# Patient Record
Sex: FEMALE | Race: WHITE
[De-identification: names, ages, dates, MRNs, and addresses within clinical notes are randomized per-mention and may not be internally consistent; named-entity substitution may affect disease eponyms.]

---

## 2017-09-01 ENCOUNTER — HOSPITAL ENCOUNTER (EMERGENCY)
Dept: HOSPITAL 75 - ER | Age: 37
Discharge: HOME | End: 2017-09-01
Payer: COMMERCIAL

## 2017-09-01 VITALS — HEIGHT: 63 IN | BODY MASS INDEX: 35.44 KG/M2 | WEIGHT: 200 LBS

## 2017-09-01 VITALS — SYSTOLIC BLOOD PRESSURE: 128 MMHG | DIASTOLIC BLOOD PRESSURE: 67 MMHG

## 2017-09-01 DIAGNOSIS — E27.8: ICD-10-CM

## 2017-09-01 DIAGNOSIS — Z87.442: ICD-10-CM

## 2017-09-01 DIAGNOSIS — R10.31: Primary | ICD-10-CM

## 2017-09-01 DIAGNOSIS — Z96.0: ICD-10-CM

## 2017-09-01 DIAGNOSIS — Z98.51: ICD-10-CM

## 2017-09-01 DIAGNOSIS — F17.210: ICD-10-CM

## 2017-09-01 LAB
ALBUMIN SERPL-MCNC: 3.9 GM/DL (ref 3.2–4.5)
ALT SERPL-CCNC: 18 U/L (ref 0–55)
ANION GAP SERPL CALC-SCNC: 13 MMOL/L (ref 5–14)
AST SERPL-CCNC: 16 U/L (ref 5–34)
BASOPHILS # BLD AUTO: 0 10^3/UL (ref 0–0.1)
BASOPHILS NFR BLD AUTO: 0 % (ref 0–10)
BILIRUB SERPL-MCNC: 0.3 MG/DL (ref 0.1–1)
BILIRUB UR QL STRIP: NEGATIVE
BUN SERPL-MCNC: 7 MG/DL (ref 7–18)
BUN/CREAT SERPL: 10
CALCIUM SERPL-MCNC: 9.4 MG/DL (ref 8.5–10.1)
CHLORIDE SERPL-SCNC: 106 MMOL/L (ref 98–107)
CO2 SERPL-SCNC: 19 MMOL/L (ref 21–32)
CREAT SERPL-MCNC: 0.69 MG/DL (ref 0.6–1.3)
EOSINOPHIL # BLD AUTO: 0.1 10^3/UL (ref 0–0.3)
EOSINOPHIL NFR BLD AUTO: 1 % (ref 0–10)
ERYTHROCYTE [DISTWIDTH] IN BLOOD BY AUTOMATED COUNT: 14.2 % (ref 10–14.5)
GFR SERPLBLD BASED ON 1.73 SQ M-ARVRAT: > 60 ML/MIN
GLUCOSE SERPL-MCNC: 107 MG/DL (ref 70–105)
KETONES UR QL STRIP: NEGATIVE
LEUKOCYTE ESTERASE UR QL STRIP: NEGATIVE
LYMPHOCYTES # BLD AUTO: 2.6 X 10^3 (ref 1–4)
LYMPHOCYTES NFR BLD AUTO: 34 % (ref 12–44)
MCH RBC QN AUTO: 31 PG (ref 25–34)
MCHC RBC AUTO-ENTMCNC: 34 G/DL (ref 32–36)
MCV RBC AUTO: 90 FL (ref 80–99)
MONOCYTES # BLD AUTO: 0.5 X 10^3 (ref 0–1)
MONOCYTES NFR BLD AUTO: 7 % (ref 0–12)
NEUTROPHILS # BLD AUTO: 4.4 X 10^3 (ref 1.8–7.8)
NEUTROPHILS NFR BLD AUTO: 59 % (ref 42–75)
NITRITE UR QL STRIP: NEGATIVE
PH UR STRIP: 6.5 [PH] (ref 5–9)
PLATELET # BLD: 268 10^3/UL (ref 130–400)
PMV BLD AUTO: 10.5 FL (ref 7.4–10.4)
POTASSIUM SERPL-SCNC: 3.2 MMOL/L (ref 3.6–5)
PROT SERPL-MCNC: 6.9 GM/DL (ref 6.4–8.2)
PROT UR QL STRIP: NEGATIVE
RBC # BLD AUTO: 4.07 10^6/UL (ref 4.35–5.85)
SODIUM SERPL-SCNC: 138 MMOL/L (ref 135–145)
SP GR UR STRIP: 1.01 (ref 1.02–1.02)
UROBILINOGEN UR-MCNC: NORMAL MG/DL
WBC # BLD AUTO: 7.5 10^3/UL (ref 4.3–11)
WBC #/AREA URNS HPF: (no result) /HPF

## 2017-09-01 PROCEDURE — 85025 COMPLETE CBC W/AUTO DIFF WBC: CPT

## 2017-09-01 PROCEDURE — 80306 DRUG TEST PRSMV INSTRMNT: CPT

## 2017-09-01 PROCEDURE — 96361 HYDRATE IV INFUSION ADD-ON: CPT

## 2017-09-01 PROCEDURE — 80053 COMPREHEN METABOLIC PANEL: CPT

## 2017-09-01 PROCEDURE — 74176 CT ABD & PELVIS W/O CONTRAST: CPT

## 2017-09-01 PROCEDURE — 81000 URINALYSIS NONAUTO W/SCOPE: CPT

## 2017-09-01 PROCEDURE — 36415 COLL VENOUS BLD VENIPUNCTURE: CPT

## 2017-09-01 PROCEDURE — 96374 THER/PROPH/DIAG INJ IV PUSH: CPT

## 2017-09-01 PROCEDURE — 84703 CHORIONIC GONADOTROPIN ASSAY: CPT

## 2017-09-01 PROCEDURE — 74000: CPT

## 2017-09-01 NOTE — DIAGNOSTIC IMAGING REPORT
INDICATION: Right-sided abdominal pain.



COMPARISON: CT abdomen and pelvis performed earlier same day.



FINDINGS:

Nonobstructive bowel gas pattern. No free intraperitoneal air.

Cholecystectomy. No mineralized renal or ureteral calculi by

radiography. Lung bases are clear. Normal regional skeleton.



IMPRESSION:

1. No acute abnormality of the abdomen by radiography.



Dictated by: 



  Dictated on workstation # [id number]

## 2017-09-01 NOTE — ED ABDOMINAL PAIN
General


Chief Complaint:  Abdominal/GI Problems


Stated Complaint:  ABD PAIN,LOWER RT SIDE


Nursing Triage Note:  


PT TO ED 7 W/ C/O RLQ PAIN ONSET X4 DAYS, WORSE TODAY.  DENIES N/V/D.  DENIES 


ANY URINARY C/O BUT DOES REPORT FEELS SIMILAR TO WHEN SHE HAD A KIDNEY STONE 


LAST


Sepsis Screen:  No Definite Risk


Source of Information:  Patient





History of Present Illness


Time Seen By Provider:  00:24


Initial Comments


C/O RLQ PAIN X 4 DAYS, AND GRADUALLY GETTING WORSE


PAIN IS CONSTANT AND IS MUCH WORSE TODAY


PAIN IS WORSE WITH MOVEMENTS, BENDING OVER


STATES SHE HAS HAD A KIDNEY STONE IN THE PAST AND THIS FEELS SIMILAR


NO URINARY SYMPTOMS


NO FEVER


NO NAUSEA/VOMITING


NO CHANGE IN CHRONIC LOWER BACK PAIN 


HAS NOT TAKEN ANYTHING FOR PAIN 


LMP 1 WEEK AGO, NORMAL. PT HAS HAD BTL





PCP--JUST MOVED HERE 1 MONTH AGO FROM [place], BUT PLANS TO  ESTABLISH WITH [facility]





Allergies and Home Medications


Allergies


Coded Allergies:  


     No Known Drug Allergies (Unverified , 9/1/17)





Home Medications


Cyclobenzaprine HCl 10 Mg Tablet, 10 MG PO Q8H, #15


   Prescribed by: BECKY KESSLER on 9/1/17 0148


Naproxen 500 Mg Tablet, 500 MG PO BID, #20


   Prescribed by: BECKY KESSLER on 9/1/17 0148





Review of Systems


Constitutional:  no symptoms reported


Respiratory:  No Symptoms Reported


Cardiovascular:  No Symptoms Reported


Gastrointestinal:  See HPI, Abdominal Pain, Denies Constipated, Denies Diarrhea

, Denies Nausea, Denies Vomiting


Genitourinary:  No Symptoms Reported


Musculoskeletal:  see HPI


Skin:  no symptoms reported


Psychiatric/Neurological:  No Symptoms Reported


Endocrine:  No Symptoms Reported


Hematologic/Lymphatic:  No Symptoms Reported





Past Medical-Social-Family Hx


Patient Social History


Alcohol Use:  Occasionally Uses


Recreational Drug Use:  No


Smoking Status:  Current Everyday Smoker (1 PPD)


Type Used:  Cigarettes


Recent Foreign Travel:  No


Contact w/Someone Who Travel:  No


Recent Infectious Disease Expo:  No


Recent Hopitalizations:  No


Physical Abuse:  No


Sexual Abuse:  No


Mistreated:  No


Fear:  No





Surgeries


History of Surgeries:  Yes (KIDNEY STONE/URETERAL STENT PLACEMENT, D&C)


Surgeries:  Gallbladder, Renal, Tubal Ligation





Respiratory


History of Respiratory Disorde:  No





Cardiovascular


History of Cardiac Disorders:  No





Neurological


History of Neurological Disord:  No





Reproductive System


Pregnant:  No


Hx Reproductive Disorders:  No


GYN History:  Tubal Ligation





Genitourinary


History of Genitourinary Disor:  Yes


Genitourinary Disorders:  Kidney Stones





Gastrointestinal


History of Gastrointestinal Di:  Yes


Gastrointestinal Disorders:  Gastroesophageal Reflux





Musculoskeletal


History of Musculoskeletal Dis:  Yes


Musculoskeletal Disorders:  Chronic Back Pain





Endocrine


History of Endocrine Disorders:  No





HEENT


History of HEENT Disorders:  No





Cancer


History of Cancer:  No





Psychosocial


History of Psychiatric Problem:  No


Suicide Risk Score:  0





Integumentary


History of Skin or Integumenta:  No





Blood Transfusions


History of Blood Disorders:  No





Physical Exam


Vital Signs


Capillary Refill : Less Than 3 Seconds


General Appearance:  WD/WN, no apparent distress


HEENT:  PERRL/EOMI, other (EXTENSIVE DENTAL DECAY)


Neck:  normal inspection


Respiratory:  normal breath sounds, no respiratory distress, no accessory 

muscle use


Cardiovascular:  regular rate, rhythm, no murmur


Gastrointestinal:  normal bowel sounds, soft, no organomegaly, no pulsatile mass

, No distended, No guarding, No rebound, tenderness (RLQ), No hernia, No mass


Extremities:  normal inspection


Back:  normal inspection, no CVA tenderness


Neurologic/Psychiatric:  CNs II-XII nml as tested, no motor/sensory deficits, 

alert, normal mood/affect, oriented x 3


Skin:  normal color, warm/dry, other (MULTIPLE SORES/SCABS/SCARS TO FACE)





Progress/Results/Core Measures


Results/Orders


Lab Results





Laboratory Tests








Test


  9/1/17


00:22 9/1/17


00:36 Range/Units


 


 


Urine Color YELLOW    


 


Urine Clarity


  SLIGHTLY


CLOUDY 


   


 


 


Urine pH 6.5   5-9  


 


Urine Specific Gravity 1.015 L  1.016-1.022  


 


Urine Protein NEGATIVE   NEGATIVE  


 


Urine Glucose (UA) NEGATIVE   NEGATIVE  


 


Urine Ketones NEGATIVE   NEGATIVE  


 


Urine Nitrite NEGATIVE   NEGATIVE  


 


Urine Bilirubin NEGATIVE   NEGATIVE  


 


Urine Urobilinogen NORMAL   NORMAL  MG/DL


 


Urine Leukocyte Esterase NEGATIVE   NEGATIVE  


 


Urine RBC (Auto) NEGATIVE   NEGATIVE  


 


Urine RBC NONE    /HPF


 


Urine WBC NONE    /HPF


 


Urine Squamous Epithelial


Cells 10-25 H


  


   /HPF


 


 


Urine Crystals NONE    /LPF


 


Urine Bacteria TRACE    /HPF


 


Urine Casts NONE    /LPF


 


Urine Mucus SMALL H   /LPF


 


Urine Culture Indicated NO    


 


Urine Pregnancy Test NEGATIVE   NEGATIVE  


 


Urine Opiates Screen POSITIVE H  NEGATIVE  


 


Urine Oxycodone Screen NEGATIVE   NEGATIVE  


 


Urine Methadone Screen NEGATIVE   NEGATIVE  


 


Urine Propoxyphene Screen NEGATIVE   NEGATIVE  


 


Urine Barbiturates Screen NEGATIVE   NEGATIVE  


 


Ur Tricyclic Antidepressants


Screen NEGATIVE 


  


  NEGATIVE  


 


 


Urine Phencyclidine Screen NEGATIVE   NEGATIVE  


 


Urine Amphetamines Screen NEGATIVE   NEGATIVE  


 


Urine Methamphetamines Screen NEGATIVE   NEGATIVE  


 


Urine Benzodiazepines Screen NEGATIVE   NEGATIVE  


 


Urine Cocaine Screen NEGATIVE   NEGATIVE  


 


Urine Cannabinoids Screen NEGATIVE   NEGATIVE  


 


White Blood Count


  


  7.5 


  4.3-11.0


10^3/uL


 


Red Blood Count


  


  4.07 L


  4.35-5.85


10^6/uL


 


Hemoglobin  12.6  11.5-16.0  G/DL


 


Hematocrit  37  35-52  %


 


Mean Corpuscular Volume  90  80-99  FL


 


Mean Corpuscular Hemoglobin  31  25-34  PG


 


Mean Corpuscular Hemoglobin


Concent 


  34 


  32-36  G/DL


 


 


Red Cell Distribution Width  14.2  10.0-14.5  %


 


Platelet Count


  


  268 


  130-400


10^3/uL


 


Mean Platelet Volume  10.5 H 7.4-10.4  FL


 


Neutrophils (%) (Auto)  59  42-75  %


 


Lymphocytes (%) (Auto)  34  12-44  %


 


Monocytes (%) (Auto)  7  0-12  %


 


Eosinophils (%) (Auto)  1  0-10  %


 


Basophils (%) (Auto)  0  0-10  %


 


Neutrophils # (Auto)  4.4  1.8-7.8  X 10^3


 


Lymphocytes # (Auto)  2.6  1.0-4.0  X 10^3


 


Monocytes # (Auto)  0.5  0.0-1.0  X 10^3


 


Eosinophils # (Auto)


  


  0.1 


  0.0-0.3


10^3/uL


 


Basophils # (Auto)


  


  0.0 


  0.0-0.1


10^3/uL


 


Sodium Level  138  135-145  MMOL/L


 


Potassium Level  3.2 L 3.6-5.0  MMOL/L


 


Chloride Level  106    MMOL/L


 


Carbon Dioxide Level  19 L 21-32  MMOL/L


 


Anion Gap  13  5-14  MMOL/L


 


Blood Urea Nitrogen  7  7-18  MG/DL


 


Creatinine


  


  0.69 


  0.60-1.30


MG/DL


 


Estimat Glomerular Filtration


Rate 


  > 60 


   


 


 


BUN/Creatinine Ratio  10   


 


Glucose Level  107 H   MG/DL


 


Calcium Level  9.4  8.5-10.1  MG/DL


 


Total Bilirubin  0.3  0.1-1.0  MG/DL


 


Aspartate Amino Transf


(AST/SGOT) 


  16 


  5-34  U/L


 


 


Alanine Aminotransferase


(ALT/SGPT) 


  18 


  0-55  U/L


 


 


Alkaline Phosphatase  101    U/L


 


Total Protein  6.9  6.4-8.2  GM/DL


 


Albumin  3.9  3.2-4.5  GM/DL








My Orders





Orders - BECKY KESSLER DO


Urine Pregnancy Bedside (9/1/17 00:23)


Ua Culture If Indicated (9/1/17 00:23)


Cbc With Automated Diff (9/1/17 00:32)


Comprehensive Metabolic Panel (9/1/17 00:32)


Drug Screen Stat (Urine) (9/1/17 00:32)


Ct Abd/Pelvis Wo(Kidney Stone) (9/1/17 00:32)


Abdomen/Kub 1view (9/1/17 00:32)


Saline Lock/Iv-Start (9/1/17 00:32)


Ketorolac Injection (Toradol Injection) (9/1/17 00:32)


Saline Lock/Iv-Start (9/1/17 00:32)


Lactated Ringers (Lr 1000 Ml Iv Solution (9/1/17 00:32)


Hcg,Qualitative Urine (9/1/17 00:57)


Rx-Cyclobenzaprine Tablet (Rx-Flexeril T (9/1/17 01:45)


Iv Push Med Admin Ed (9/1/17 )





Medications Given in ED





Vital Signs/I&O











Blood Pressure Mean:  94








Progress Note :  


Progress Note


PAIN EASED AT DISMISSAL





Diagnostic Imaging





Comments


ACUTE ABDOMEN XRAYS--NO ACUTE PROCESS, PENDING RADIOLOGIST REVIEW





CT ABDOMEN/PELVIS--NO ACUTE PROCESS, NO RENAL/URETERAL STONES, LEFT ADRENAL 

NODULE, SMALL HIATAL HERNIA AND FAT-CONTAINING UMBILICAL HERNIA--PER [facility]RAD 

VIA FAX @ [phone]


   Reviewed:  Reviewed by Me





Departure


Impression


Impression:  


 Primary Impression:  


 RLQ abdominal pain


 Additional Impression:  


 LEFT ADRENAL NODULE


Disposition:  01 HOME, SELF-CARE


Condition:  Stable





Departure-Patient Inst.


Referrals:  


NO,LOCAL PHYSICIAN (PCP)


Primary Care Physician








[facility]


Patient Instructions:  Acute Abdomen (Belly Pain), Adult (DC)





Add. Discharge Instructions:  


ACTIVITIES AS TOLERATED





FOLLOW UP WITH  IN 3-4 DAYS IF NO BETTER--LIST PROVIDED


RETURN TO ER IF WORSE





FOLLOW UP WITH  FOR FURTHER EVALUATION OF ADRENAL NODULE





All discharge instructions reviewed with patient and/or family. Voiced 

understanding.


Scripts


Cyclobenzaprine HCl (Cyclobenzaprine HCl) 10 Mg Tablet


10 MG PO Q8H, #15 TAB


   Prov: BECKY KESSLER DO         9/1/17 


Naproxen (Naproxen) 500 Mg Tablet


500 MG PO BID, #20 TAB


   Prov: BECKY KESSLER DO         9/1/17


Work/School Note:  Local Medical Staff Listing











BECKY KESSLER DO Sep 1, 2017 01:49

## 2017-09-01 NOTE — DIAGNOSTIC IMAGING REPORT
PROCEDURE: CT urinary tract, rule out kidney stone.



TECHNIQUE: Multiple contiguous axial images were obtained through

the abdomen and pelvis without the use of intravenous contrast.



INDICATION: Right-sided abdominal pain.



COMPARISON STUDIES: None.



FINDINGS: There is a minimal hiatal hernia. Lung bases are clear.

The gallbladder is absent. No duct dilatation or calculi are

seen. The liver, spleen, pancreas, and kidneys appear normal.

There is a left adrenal nodule measuring zero Hounsfield units.

This is 1.8 cm in diameter. Findings are consistent with an

adenoma. The appendix and bowel loops appear normal. A minimal

umbilical hernia is present containing fat only. No inflammation

is present. The urinary bladder, uterus and adnexal structures

appear normal.



IMPRESSION:

1. There is a minimal hiatal hernia and umbilical hernia

containing fat only.

2. Left adrenal nodule consistent with an adenoma.

3. No acute findings are seen.



Dictated by: 



  Dictated on workstation # [id number]

## 2017-11-14 ENCOUNTER — HOSPITAL ENCOUNTER (OUTPATIENT)
Dept: HOSPITAL 75 - RAD | Age: 37
End: 2017-11-14
Attending: NURSE PRACTITIONER
Payer: MEDICAID

## 2017-11-14 DIAGNOSIS — H47.10: Primary | ICD-10-CM

## 2017-11-14 DIAGNOSIS — Z53.29: ICD-10-CM

## 2017-11-17 ENCOUNTER — HOSPITAL ENCOUNTER (EMERGENCY)
Dept: HOSPITAL 75 - ER | Age: 37
Discharge: HOME | End: 2017-11-17
Payer: MEDICAID

## 2017-11-17 VITALS — BODY MASS INDEX: 35.44 KG/M2 | WEIGHT: 200 LBS | HEIGHT: 63 IN

## 2017-11-17 VITALS — DIASTOLIC BLOOD PRESSURE: 70 MMHG | SYSTOLIC BLOOD PRESSURE: 121 MMHG

## 2017-11-17 DIAGNOSIS — L02.214: Primary | ICD-10-CM

## 2017-11-17 DIAGNOSIS — Z86.19: ICD-10-CM

## 2017-11-17 DIAGNOSIS — Z96.0: ICD-10-CM

## 2017-11-17 DIAGNOSIS — Z98.51: ICD-10-CM

## 2017-11-17 DIAGNOSIS — Z87.442: ICD-10-CM

## 2017-11-17 DIAGNOSIS — K21.9: ICD-10-CM

## 2017-11-17 LAB
BASOPHILS # BLD AUTO: 0 10^3/UL (ref 0–0.1)
BASOPHILS NFR BLD AUTO: 0 % (ref 0–10)
BILIRUB UR QL STRIP: NEGATIVE
EOSINOPHIL # BLD AUTO: 0 10^3/UL (ref 0–0.3)
EOSINOPHIL NFR BLD AUTO: 0 % (ref 0–10)
ERYTHROCYTE [DISTWIDTH] IN BLOOD BY AUTOMATED COUNT: 14 % (ref 10–14.5)
KETONES UR QL STRIP: NEGATIVE
LEUKOCYTE ESTERASE UR QL STRIP: NEGATIVE
LYMPHOCYTES # BLD AUTO: 1.9 X 10^3 (ref 1–4)
LYMPHOCYTES NFR BLD AUTO: 19 % (ref 12–44)
MCH RBC QN AUTO: 31 PG (ref 25–34)
MCHC RBC AUTO-ENTMCNC: 34 G/DL (ref 32–36)
MCV RBC AUTO: 92 FL (ref 80–99)
MONOCYTES # BLD AUTO: 0.9 X 10^3 (ref 0–1)
MONOCYTES NFR BLD AUTO: 8 % (ref 0–12)
NEUTROPHILS # BLD AUTO: 7.4 X 10^3 (ref 1.8–7.8)
NEUTROPHILS NFR BLD AUTO: 72 % (ref 42–75)
NITRITE UR QL STRIP: NEGATIVE
PH UR STRIP: 6.5 [PH] (ref 5–9)
PLATELET # BLD: 245 10^3/UL (ref 130–400)
PMV BLD AUTO: 10.5 FL (ref 7.4–10.4)
PROT UR QL STRIP: NEGATIVE
RBC # BLD AUTO: 3.88 10^6/UL (ref 4.35–5.85)
SP GR UR STRIP: 1.01 (ref 1.02–1.02)
SQUAMOUS #/AREA URNS HPF: (no result) /HPF
UROBILINOGEN UR-MCNC: NORMAL MG/DL
WBC # BLD AUTO: 10.3 10^3/UL (ref 4.3–11)
WBC #/AREA URNS HPF: (no result) /HPF

## 2017-11-17 PROCEDURE — 87205 SMEAR GRAM STAIN: CPT

## 2017-11-17 PROCEDURE — 87070 CULTURE OTHR SPECIMN AEROBIC: CPT

## 2017-11-17 PROCEDURE — 36415 COLL VENOUS BLD VENIPUNCTURE: CPT

## 2017-11-17 PROCEDURE — 72193 CT PELVIS W/DYE: CPT

## 2017-11-17 PROCEDURE — 81000 URINALYSIS NONAUTO W/SCOPE: CPT

## 2017-11-17 PROCEDURE — 85025 COMPLETE CBC W/AUTO DIFF WBC: CPT

## 2017-11-17 NOTE — ED INTEGUMENTARY GENERAL
General


Stated Complaint:  VAG ABSCESS/LOWER ABD PAIN


Source:  patient


Exam Limitations:  no limitations





History of Present Illness


Time seen by provider:  11:11


Initial Comments


To ER with an abscess to the left side of the mons pubis present for about 4 

days as well as  some lower abdominal pain.  No dysuria.  She has a history of 

MRSA abscesses.  No fevers or chills.


Timing/Duration:  week


Severity:  moderate





Allergies and Home Medications


Allergies


Coded Allergies:  


     No Known Drug Allergies (Unverified , 17)





Home Medications


Cyclobenzaprine HCl 10 Mg Tablet, 10 MG PO Q8H, #15


   Prescribed by: BECKY KESSLER on 17 0148


Hydrocodone/Acetaminophen 1 Each Tablet, 1 EACH PO Q4H PRN for PAIN-SEVERE, #14


   Prescribed by: DUSTY SALGADO on 17 1156


Naproxen 500 Mg Tablet, 500 MG PO BID, #20


   Prescribed by: BECKY KESSLER on 17 0148


Sulfamethoxazole/Trimethoprim 1 Each Tablet, 1 EACH PO BID, #14


   Prescribed by: DUSTY SALGADO on 17 1156





Constitutional:  see HPI, No chills


EENTM:  see HPI


Respiratory:  no symptoms reported


Cardiovascular:  no symptoms reported


Genitourinary:  no symptoms reported


Musculoskeletal:  no symptoms reported


Skin:  no symptoms reported


Psychiatric/Neurological:  No Symptoms Reported





Past Medical-Social-Family Hx


Patient Social History


Type Used:  Cigarettes


Recent Foreign Travel:  No


Contact w/Someone Who Travel:  No


Recent Hopitalizations:  No





Surgeries


History of Surgeries:  Yes (KIDNEY STONE/URETERAL STENT PLACEMENT, D&C)


Surgeries:  Gallbladder, Renal, Tubal Ligation





Respiratory


History of Respiratory Disorde:  No





Cardiovascular


History of Cardiac Disorders:  No





Neurological


History of Neurological Disord:  No





Reproductive System


Hx Reproductive Disorders:  No


GYN History:  Tubal Ligation





Genitourinary


History of Genitourinary Disor:  Yes


Genitourinary Disorders:  Kidney Stones





Gastrointestinal


History of Gastrointestinal Di:  Yes


Gastrointestinal Disorders:  Gastroesophageal Reflux





Musculoskeletal


History of Musculoskeletal Dis:  Yes


Musculoskeletal Disorders:  Chronic Back Pain





Endocrine


History of Endocrine Disorders:  No





HEENT


History of HEENT Disorders:  No





Cancer


History of Cancer:  No





Psychosocial


History of Psychiatric Problem:  No





Integumentary


History of Skin or Integumenta:  No





Blood Transfusions


History of Blood Disorders:  No





Physical Exam


Vital Signs





Vital Sign - Last 12Hours








 17





 11:11


 


Temp 98.5


 


Pulse 108


 


Resp 18


 


B/P (MAP) 117/81


 


Pulse Ox 100





Capillary Refill :


General Appearance:  WD/WN, no apparent distress


HEENT:  PERRL/EOMI, normal ENT inspection


Neck:  non-tender, full range of motion


Respiratory:  no respiratory distress, no accessory muscle use


Gastrointestinal:  normal bowel sounds, non tender, soft


Neurologic/Psychiatric:  alert, normal mood/affect, oriented x 3


Skin:  normal color, warm/dry


Skin Problem Character:  abscess (induration to the left side of the mons pubis 

up into the inguinal canal that measures about 10 cm in length.  Minimal 

erythema.  There is a small area of fluctuance.)





I&D :  


   Blade Size:  11


   I & D Procedure:  betadine prep


   Packing/Drain:   Penrose Drain


Progress


Anesthetized with 7 mL of 2 percent lidocaine without epinephrine.  Single stab 

incision made with 11 blade scalpel.  Moderate amount of prune material 

expressed.  Foul-smelling.  Culture collected and sent to lab.  Ordering 

Penrose suture in place with 2 sutures size 4-0 Prolene.





Progress/Results/Core Measures


Results/Orders


Lab Results





Laboratory Tests








Test


  17


11:05 17


11:21 Range/Units


 


 


Urine Color YELLOW    


 


Urine Clarity CLEAR    


 


Urine pH 6.5   5-9  


 


Urine Specific Gravity 1.010 L  1.016-1.022  


 


Urine Protein NEGATIVE   NEGATIVE  


 


Urine Glucose (UA) NEGATIVE   NEGATIVE  


 


Urine Ketones NEGATIVE   NEGATIVE  


 


Urine Nitrite NEGATIVE   NEGATIVE  


 


Urine Bilirubin NEGATIVE   NEGATIVE  


 


Urine Urobilinogen NORMAL   NORMAL  MG/DL


 


Urine Leukocyte Esterase NEGATIVE   NEGATIVE  


 


Urine RBC (Auto) 2+ H  NEGATIVE  


 


Urine RBC NONE    /HPF


 


Urine WBC NONE    /HPF


 


Urine Squamous Epithelial


Cells 5-10 


  


   /HPF


 


 


Urine Crystals NONE    /LPF


 


Urine Bacteria TRACE    /HPF


 


Urine Casts NONE    /LPF


 


Urine Mucus NEGATIVE    /LPF


 


Urine Culture Indicated NO    


 


White Blood Count


  


  10.3 


  4.3-11.0


10^3/uL


 


Red Blood Count


  


  3.88 L


  4.35-5.85


10^6/uL


 


Hemoglobin  12.1  11.5-16.0  G/DL


 


Hematocrit  36  35-52  %


 


Mean Corpuscular Volume  92  80-99  FL


 


Mean Corpuscular Hemoglobin  31  25-34  PG


 


Mean Corpuscular Hemoglobin


Concent 


  34 


  32-36  G/DL


 


 


Red Cell Distribution Width  14.0  10.0-14.5  %


 


Platelet Count


  


  245 


  130-400


10^3/uL


 


Mean Platelet Volume  10.5 H 7.4-10.4  FL


 


Neutrophils (%) (Auto)  72  42-75  %


 


Lymphocytes (%) (Auto)  19  12-44  %


 


Monocytes (%) (Auto)  8  0-12  %


 


Eosinophils (%) (Auto)  0  0-10  %


 


Basophils (%) (Auto)  0  0-10  %


 


Neutrophils # (Auto)  7.4  1.8-7.8  X 10^3


 


Lymphocytes # (Auto)  1.9  1.0-4.0  X 10^3


 


Monocytes # (Auto)  0.9  0.0-1.0  X 10^3


 


Eosinophils # (Auto)


  


  0.0 


  0.0-0.3


10^3/uL


 


Basophils # (Auto)


  


  0.0 


  0.0-0.1


10^3/uL








My Orders





Orders - DUSTY SALGADO


Cbc With Automated Diff (17 11:09)


Saline Lock/Iv-Start (17 11:09)


Ua Culture If Indicated (17 11:09)


Ct Pelvis W (17 11:09)


Lidocaine 2% Injection 20 Ml (Xylocaine (17 11:15)


Wound Culture (17 11:09)


Sulfamethoxazole/Trimet Ds Tab (Bactrim (17 11:15)


Hydrocodone/Apap 5/325 Tablet (Lortab 5 (17 11:15)


Iohexol Injection (Omnipaque 350 Mg/Ml 1 (17 11:15)


Ns (Ivpb) (Sodium Chloride 0.9% Ivpb Bag (17 11:15)





Medications Given in ED





Current Medications








 Medications  Dose


 Ordered  Sig/Jam


 Route  Start Time


 Stop Time Status Last Admin


Dose Admin


 


 Acetaminophen/


 Hydrocodone Bitart  1 tab  ONCE  ONCE


 PO  17 11:15


 17 11:16 DC 17 11:17


1 TAB


 


 Iohexol  100 ml  ONCE  ONCE


 IV  17 11:15


 17 11:22 DC 17 12:13


100 ML


 


 Lidocaine HCl  20 ml  ONCE  ONCE


 INJ  17 11:15


 17 11:16 DC 11/17/17 11:30


8 ML


 


 Sodium Chloride  100 ml  ONCE  ONCE


 IV  17 11:15


 17 11:22 DC 17 12:14


80 ML


 


 Trimethoprim/


 Sulfamethoxazole  1 ea  ONCE  ONCE


 PO  17 11:15


 17 11:16 DC 17 11:17


1 EA








Vital Signs/I&O





Vital Sign - Last 12Hours








 17





 11:11


 


Temp 98.5


 


Pulse 108


 


Resp 18


 


B/P (MAP) 117/81


 


Pulse Ox 100











Diagnostic Imaging





   Diagonstic Imaging:  CT


Comments


NAME:   WILLAM ELKINS


MED REC#:   [id number]


ACCOUNT#:   [id number]


PT STATUS:   REG ER


:   1980


PHYSICIAN:   DUSTY SALGADO


ADMIT DATE:   17/ER


 ***Draft***


Date of Exam:17





CT PELVIS W








PROCEDURE: 


CT pelvis with contrast.





TECHNIQUE: 


Oral and intravenous contrast were administered with pelvic CT


performed.





INDICATION: 


Evaluation. Lower pelvic pain for 2 days.





COMPARISON: 


CT abdomen/pelvis on 2017.





FINDINGS:


There is a high attenuation object in the left inguinal region


which may be at least partially external to the patient. There is


a small focus of gas within the subcutaneous tissues at this site


as seen on axial image 46. There is adjacent inflammatory


stranding of the subcutaneous tissues. There are multiple


asymmetrically prominent left inguinal lymph nodes with one


example on axial image 39 measuring up to 13 mm in short axis.


There is no drainable fluid collection or abscess.





There is no identified inguinal hernia. There is a small


fat-containing umbilical hernia.





The uterus and adnexa are grossly unremarkable in appearance on


CT for patient age.





The appendix is normal and well seen on axial image 16 and


adjacent sequential images. The visualized portions of the


intestinal tract are not distended. There is no identified free


intraperitoneal air. There is no free pelvic fluid.





There is no otherwise noted lymph node in the pelvis which meets


CT size criteria for adenopathy.





There is no identified acute bony abnormality.





IMPRESSION:


1. High attenuation object in the left inguinal region which at


least partially appears to extend within the subcutaneous


tissues. This may potentially relate to a foreign body or marker


type device. Recommend correlation. There is a small amount of


adjacent subcutaneous gas. There is prominent adjacent


inflammatory stranding which is nonspecific although may relate


to cellulitis in the appropriate clinical scenario. There is no


drainable fluid collection or abscess.





2. Asymmetrically enlarged left inguinal lymph nodes which are


likely reactive to the process associated with the inflammatory


stranding of the subcutaneous tissues.








  Dictated on workstation # [id number]








Dict:   17 1301


Trans:   17 1311


 [id number]





Interpreted by:     FRANCY CINTRON MD


Electronically signed by:





Departure


Impression


Impression:  


 Primary Impression:  


 Groin abscess


Disposition:   HOME, SELF-CARE


Condition:  Stable





Departure-Patient Inst.


Decision time for Depature:  11:54


Referrals:  


[facility] (PCP/Family)


Primary Care Physician


Patient Instructions:  Abscess Incision and Drainage





Add. Discharge Instructions:  


1.  Leave the drain in place.  Return to the emergency room on Tuesday the  

to have the drain removed.  Keep this covered with gauze to collect drainage.  

Return to ER before then for any fevers or worsening symptoms.  Take 

antibiotics and pain medication as directed you may shower and allow water to 

run over this area.


Scripts


Hydrocodone/Acetaminophen (Norco 5-325 Tablet) 1 Each Tablet


1 EACH PO Q4H Y for PAIN-SEVERE, #14 TAB


   Prov: DUSTY SALGADO         17 


Sulfamethoxazole/Trimethoprim (Bactrim Ds Tablet) 1 Each Tablet


1 EACH PO BID, #14 TAB


   Prov: DUSTY SALGADO         17


Work/School Note:  Work Release Form   Date Seen in the Emergency Department:  

2017


   Return to Work:  2017











DUSTY SALGADO 2017 11:12

## 2017-11-17 NOTE — DIAGNOSTIC IMAGING REPORT
PROCEDURE: 

CT pelvis with contrast.



TECHNIQUE: 

Oral and intravenous contrast were administered with pelvic CT

performed.



INDICATION: 

Evaluation. Lower pelvic pain for 2 days.



COMPARISON: 

CT abdomen/pelvis on 01/20/2017.



FINDINGS:

There is a high attenuation object in the left inguinal region

which may be at least partially external to the patient. There is

a small focus of gas within the subcutaneous tissues at this site

as seen on axial image 46. There is adjacent inflammatory

stranding of the subcutaneous tissues. There are multiple

asymmetrically prominent left inguinal lymph nodes with one

example on axial image 39 measuring up to 13 mm in short axis.

There is no drainable fluid collection or abscess.



There is no identified inguinal hernia. There is a small

fat-containing umbilical hernia.



The uterus and adnexa are grossly unremarkable in appearance on

CT for patient age.



The appendix is normal and well seen on axial image 16 and

adjacent sequential images. The visualized portions of the

intestinal tract are not distended. There is no identified free

intraperitoneal air. There is no free pelvic fluid.



There is no otherwise noted lymph node in the pelvis which meets

CT size criteria for adenopathy.



There is no identified acute bony abnormality.



IMPRESSION:

1. High attenuation object in the left inguinal region which at

least partially appears to extend within the subcutaneous

tissues. This may potentially relate to a foreign body or marker

type device. Recommend correlation. There is a small amount of

adjacent subcutaneous gas. There is prominent adjacent

inflammatory stranding which is nonspecific although may relate

to cellulitis in the appropriate clinical scenario. There is no

drainable fluid collection or abscess.



2. Asymmetrically enlarged left inguinal lymph nodes which are

likely reactive to the process associated with the inflammatory

stranding of the subcutaneous tissues.



Dictated by: 



  Dictated on workstation # [id number]

## 2017-11-20 ENCOUNTER — HOSPITAL ENCOUNTER (OUTPATIENT)
Dept: HOSPITAL 75 - RAD | Age: 37
End: 2017-11-20
Attending: NURSE PRACTITIONER
Payer: MEDICAID

## 2017-11-20 DIAGNOSIS — H47.10: Primary | ICD-10-CM

## 2017-11-20 DIAGNOSIS — G43.909: ICD-10-CM

## 2017-11-20 PROCEDURE — 70551 MRI BRAIN STEM W/O DYE: CPT

## 2017-11-20 NOTE — DIAGNOSTIC IMAGING REPORT
PROCEDURE: MR imaging of the brain without contrast.



TECHNIQUE: Multiplanar, multisequence MR imaging of the brain was

performed without contrast.



INDICATION: Papilledema bilaterally. Pseudotumor. Migraines.



FINDINGS: The ventricles are normal in size, shape, and position.

There is no diffusion restriction with no acute parenchymal

edema, hemorrhage, mass, or other abnormality. There is no

extra-axial mass or hemorrhage. No intraorbital abnormality is

seen.



IMPRESSION: Normal MRI of the brain.



Dictated by: 



  Dictated on workstation # [id number]

## 2017-11-21 ENCOUNTER — HOSPITAL ENCOUNTER (EMERGENCY)
Dept: HOSPITAL 75 - ER | Age: 37
Discharge: HOME | End: 2017-11-21
Payer: MEDICAID

## 2017-11-21 VITALS — SYSTOLIC BLOOD PRESSURE: 113 MMHG | DIASTOLIC BLOOD PRESSURE: 74 MMHG

## 2017-11-21 VITALS — BODY MASS INDEX: 34.15 KG/M2 | WEIGHT: 200 LBS | HEIGHT: 64 IN

## 2017-11-21 DIAGNOSIS — L02.215: Primary | ICD-10-CM

## 2017-11-21 NOTE — ED SUTURE REMOVAL/WOUND CHECK
Suture/Wound Re-check


Suture Removal/Wound Recheck :  


   Suture Removal/Wound Recheck:  Packing removed


General Appearance:  WD/WN, no apparent distress


Skin Exam:  normal color, warm/dry





Physical Exam


Vital Signs





Vital Sign - Last 12Hours








 11/21/17





 14:30


 


Pulse 78


 


Resp 18


 


B/P (MAP) 113/74


 


Pulse Ox 99





Capillary Refill :


General Appearance:  WD/WN, no apparent distress


HEENT:  PERRL/EOMI, normal ENT inspection


Neck:  non-tender, full range of motion


Respiratory:  no respiratory distress, no accessory muscle use


Gastrointestinal:  non tender, soft


Neurologic/Psychiatric:  alert, normal mood/affect, oriented x 3


Skin:  normal color, warm/dry


Skin Problem Character:  abscess


Comments


1 suture from the Penrose drain had come out.  The other suture was clipped and 

removed.  No drainage at this time though she does report she is getting 

persistent drainage.  No erythema surrounding this.  There is about 2-3 cm of 

surrounding induration however.





Departure


Impression


Impression:  


 Primary Impression:  


 Wound check, abscess


Disposition:  01 HOME, SELF-CARE


Condition:  Improved





Departure-Patient Inst.


Decision time for Depature:  14:42


Referrals:  


[facility] (PCP)


Primary Care Physician


Patient Instructions:  SUTURE CHECK-NO COMPLICATION





Add. Discharge Instructions:  


1.  You may have continued drainage for 2-3 weeks as this wound heals.  Return 

to ER for any fevers, worsening pain, redness around this.  All discharge 

instructions reviewed with patient and/or family. Voiced understanding.PLEASE 

FOLLOW UP WITH YOUR PRIMARY CARE PHYSCIAN


Scripts


Hydrocodone/Acetaminophen (Norco 5-325 Tablet) 1 Each Tablet


1 EACH PO Q6H Y for PAIN-SEVERE TO BREAKTHROUGH, #10 TAB


   Prov: DUTSY SALGADO         11/21/17











DUSTY SALGADO Nov 21, 2017 14:43

## 2022-08-30 ENCOUNTER — HOSPITAL ENCOUNTER (EMERGENCY)
Dept: HOSPITAL 75 - ER | Age: 42
Discharge: HOME | End: 2022-08-30
Payer: SELF-PAY

## 2022-08-30 VITALS — DIASTOLIC BLOOD PRESSURE: 90 MMHG | SYSTOLIC BLOOD PRESSURE: 140 MMHG

## 2022-08-30 VITALS — BODY MASS INDEX: 37.5 KG/M2 | WEIGHT: 211.64 LBS | HEIGHT: 62.99 IN

## 2022-08-30 DIAGNOSIS — F17.200: ICD-10-CM

## 2022-08-30 DIAGNOSIS — Z28.310: ICD-10-CM

## 2022-08-30 DIAGNOSIS — N76.4: Primary | ICD-10-CM

## 2022-08-30 LAB
APTT PPP: YELLOW S
BACTERIA #/AREA URNS HPF: (no result) /HPF
BILIRUB UR QL STRIP: NEGATIVE
FIBRINOGEN PPP-MCNC: CLEAR MG/DL
GLUCOSE UR STRIP-MCNC: NEGATIVE MG/DL
KETONES UR QL STRIP: NEGATIVE
LEUKOCYTE ESTERASE UR QL STRIP: NEGATIVE
NITRITE UR QL STRIP: NEGATIVE
PH UR STRIP: 5.5 [PH] (ref 5–9)
PROT UR QL STRIP: NEGATIVE
RBC #/AREA URNS HPF: (no result) /HPF
RENAL EPI CELLS #/AREA URNS HPF: (no result) /HPF
SP GR UR STRIP: >=1.03 (ref 1.02–1.02)
SQUAMOUS #/AREA URNS HPF: (no result) /HPF
WBC #/AREA URNS HPF: (no result) /HPF

## 2022-08-30 PROCEDURE — 87088 URINE BACTERIA CULTURE: CPT

## 2022-08-30 PROCEDURE — 81000 URINALYSIS NONAUTO W/SCOPE: CPT

## 2022-08-30 PROCEDURE — 99283 EMERGENCY DEPT VISIT LOW MDM: CPT

## 2022-08-30 NOTE — ED INTEGUMENTARY GENERAL
General


Chief Complaint:  Skin/Wound Problems


Stated Complaint:  VAGINAL PAIN


Nursing Triage Note:  


ARRIVED VIA AMB TO ROOM 06 WITH COMPLAINTS OF A RIGHT GROIN ABSCESS THAT STARTED




YESTERDAY.


Source:  patient


Exam Limitations:  no limitations





History of Present Illness


Date Seen by Provider:  Aug 30, 2022


Time Seen by Provider:  18:19





Allergies and Home Medications


Allergies


Coded Allergies:  


     No Known Drug Allergies (Unverified , 11/17/17)





Patient Home Medication List


Cyclobenzaprine HCl (Cyclobenzaprine HCl) 10 Mg Tablet, 10 MG PO Q8H


   Prescribed by: BECKY KESSLER on 9/1/17 0148


Hydrocodone/Acetaminophen (Hydrocodone/Acetaminophen 5 MG/325 MG TAB) 1 Each 

Tablet, 1 EACH PO Q4H PRN for PAIN-SEVERE


   Prescribed by: DUSTY SALGADO on 11/17/17 1156


Hydrocodone/Acetaminophen (Hydrocodone/Acetaminophen 5 MG/325 MG TAB) 1 Each Ta

blet, 1 EACH PO Q6H PRN for PAIN-SEVERE TO BREAKTHROUGH


   Prescribed by: DUSTY SALGADO on 11/21/17 1443


Naproxen (Naproxen) 500 Mg Tablet, 500 MG PO BID


   Prescribed by: BECKY KESSLER on 9/1/17 0148


Sulfamethoxazole/Trimethoprim (Bactrim Ds Tablet) 1 Each Tablet, 1 EACH PO BID


   Prescribed by: NOELLE GALVEZ on 8/30/22 1828





Past Medical-Social-Family Hx


Patient Social History


Tobacco Use?:  Yes


Smokeless Tobacco Frequency:  Current Everyday User


Substance use?:  No


Alcohol Use?:  No





Immunizations Up To Date


Tetanus Booster (TDap):  Less than 5yrs





Past Medical History


Surgeries:  Yes (KIDNEY STONE/URETERAL STENT PLACEMENT, D&C)


Gallbladder, Renal, Tubal Ligation


Respiratory:  No


Cardiac:  No


Neurological:  No


Last Menstrual Period:  Aug 5, 2022


Reproductive Disorders:  No


GYN History:  Tubal Ligation


Genitourinary:  Yes


Kidney Stones


Gastrointestinal:  Yes


Gastroesophageal Reflux


Musculoskeletal:  Yes


Chronic Back Pain


Endocrine:  No


HEENT:  No


Cancer:  No


Psychosocial:  No


Integumentary:  No


Blood Disorders:  No





Physical Exam


Vital Signs





Vital Signs - First Documented








 8/30/22





 17:45


 


Temp 36.4


 


Pulse 118


 


Resp 16


 


B/P (MAP) 140/90 (107)


 


Pulse Ox 97


 


O2 Delivery Room Air





Capillary Refill : Less Than 3 Seconds





Progress/Results/Core Measures


Results/Orders


Lab Results





Laboratory Tests








Test


 8/30/22


18:15 Range/Units


 


 


Urine Color YELLOW   


 


Urine Clarity CLEAR   


 


Urine pH 5.5  5-9  


 


Urine Specific Gravity >=1.030  1.016-1.022  


 


Urine Protein NEGATIVE  NEGATIVE  


 


Urine Glucose (UA) NEGATIVE  NEGATIVE  


 


Urine Ketones NEGATIVE  NEGATIVE  


 


Urine Nitrite NEGATIVE  NEGATIVE  


 


Urine Bilirubin NEGATIVE  NEGATIVE  


 


Urine Urobilinogen 1.0  < = 1.0  MG/DL


 


Urine Leukocyte Esterase NEGATIVE  NEGATIVE  


 


Urine RBC (Auto) TRACE-I H NEGATIVE  


 


Urine RBC NONE   /HPF


 


Urine WBC 2-5   /HPF


 


Urine Squamous Epithelial


Cells 2-5 


  /HPF





 


Urine Renal Epithelial Cells NONE   /HPF


 


Urine Crystals NONE   /LPF


 


Urine Bacteria LARGE H  /HPF


 


Urine Casts NONE   /LPF


 


Urine Mucus LARGE H  /LPF


 


Urine Culture Indicated YES   








My Orders





Orders - NOELLE GALVEZ


Ua Culture If Indicated (8/30/22 17:54)


Sulfamethoxazole/Trimet Ds Tab (Bactrim (8/30/22 18:30)


Hydrocodone/Apap 5/325 Tablet (Lortab 5 (8/30/22 18:30)


Urine Culture (8/30/22 18:15)





Medications Given in ED





Current Medications








 Medications  Dose


 Ordered  Sig/Jam


 Route  Start Time


 Stop Time Status Last Admin


Dose Admin


 


 Acetaminophen/


 Hydrocodone Bitart  1 ea  ONCE  ONCE


 PO  8/30/22 18:30


 8/30/22 18:30 DC 8/30/22 18:23


1 EA


 


 Trimethoprim/


 Sulfamethoxazole  1 ea  ONCE  ONCE


 PO  8/30/22 18:30


 8/30/22 18:30 DC 8/30/22 18:22


1 EA








Vital Signs/I&O











 8/30/22





 17:45


 


Temp 36.4


 


Pulse 118


 


Resp 16


 


B/P (MAP) 140/90 (107)


 


Pulse Ox 97


 


O2 Delivery Room Air














Blood Pressure Mean:                    107











Departure


Impression





   Primary Impression:  


   Vagina boil


Disposition:  01 HOME, SELF-CARE


Condition:  Improved





Departure-Patient Inst.


Decision time for Depature:  18:22


Referrals:  


[facility] (PCP/Family)


Primary Care Physician


Patient Instructions:  Boil, Adult ED





Add. Discharge Instructions:  


1. Most boils will go away on their own in a few days or within a week or two. 

You can help ease the symptoms and speed up the process by taking the following 

steps:


2. Apply a warm compress. Place a clean, warm, wet washcloth over the boil, and 

leave it there for 10 to 15 minutes. Repeat this process three or four times a 

day until the boil is gone. The heat from the compress helps promote more blood 

circulation, so white blood cells can fight off the remaining infection.


3. Wear loose bottoms while its healing. Until the boil disappears, reduce 

friction in the area, and wear loose underwear and clothing. 


4. Clean and protect. If the boil bursts, clean the area thoroughly and apply an

antibiotic ointment like combined bacitracin, neomycin, and polymyxin B 

(Neosporin). Then, cover with a sterile gauze or adhesive bandage. Keep the area

clean, and change the dressing daily.


5. Dont pop or prick. Avoid picking or piercing the boil. Opening the boil 

releases the bacteria and can spread the infection. You may also make the pain 

and tenderness worse.


6. Take over-the-counter (OTC) painkillers. OTC pain medication may be necessary

to ease the pain and inflammation the boil causes. Take ibuprofen (Advil) or 

acetaminophen (Tylenol) according to the package directions.


7. Wash your hands. Before you touch the boil or surrounding area, wash your 

hands with antibacterial soap and warm water. This will help keep you from 

introducing new bacteria to the boil. Wash your hands after youve touched the 

boil, too, to prevent spreading the infection to other areas of your body.


8. Take antibiotics twice a day as directed even if your symptoms begin to 

improve. 








All discharge instructions reviewed with patient and/or family. Voiced 

understanding.


Scripts


Hydrocodone/Acetaminophen (Hydrocodone-Acetamin 5-325 mg) 5 Mg-325 Mg Tablet


1 TAB PO Q6H PRN for PAIN-MODERATE (5-7), #10 TAB 0 Refills


   Prov: NOELLE GALVEZ APRN         8/30/22 


Mupirocin Calcium (Mupirocin) 2 % Cream..g.


1 APPFUL TP BID, #15 GM 0 Refills


   Prov: NOELLE GALVEZ APRN         8/30/22 


Sulfamethoxazole/Trimethoprim (Bactrim Ds Tablet) 1 Each Tablet


1 EACH PO BID, #14 TAB


   Prov: NOELLE GALVEZ APRN         8/30/22











NOELLE GALVEZ APRN           Aug 30, 2022 18:27

## 2023-09-12 ENCOUNTER — HOSPITAL ENCOUNTER (EMERGENCY)
Dept: HOSPITAL 75 - ER | Age: 43
Discharge: HOME | End: 2023-09-12
Payer: SELF-PAY

## 2023-09-12 VITALS — HEIGHT: 63.78 IN | BODY MASS INDEX: 38.56 KG/M2 | WEIGHT: 223.11 LBS

## 2023-09-12 VITALS — SYSTOLIC BLOOD PRESSURE: 145 MMHG | DIASTOLIC BLOOD PRESSURE: 83 MMHG

## 2023-09-12 DIAGNOSIS — R10.31: Primary | ICD-10-CM

## 2023-09-12 DIAGNOSIS — F17.210: ICD-10-CM

## 2023-09-12 DIAGNOSIS — Z87.442: ICD-10-CM

## 2023-09-12 LAB
APTT PPP: YELLOW S
BACTERIA #/AREA URNS HPF: (no result) /HPF
BASOPHILS # BLD AUTO: 0 10^3/UL (ref 0–0.1)
BASOPHILS NFR BLD AUTO: 0 % (ref 0–10)
BILIRUB UR QL STRIP: NEGATIVE
BUN/CREAT SERPL: 11
CALCIUM SERPL-MCNC: 9.3 MG/DL (ref 8.5–10.1)
CHLORIDE SERPL-SCNC: 108 MMOL/L (ref 98–107)
CO2 SERPL-SCNC: 19 MMOL/L (ref 21–32)
CREAT SERPL-MCNC: 0.74 MG/DL (ref 0.6–1.3)
EOSINOPHIL # BLD AUTO: 0.1 10^3/UL (ref 0–0.3)
EOSINOPHIL NFR BLD AUTO: 1 % (ref 0–10)
FIBRINOGEN PPP-MCNC: CLEAR MG/DL
GFR SERPLBLD BASED ON 1.73 SQ M-ARVRAT: 104 ML/MIN
GLUCOSE SERPL-MCNC: 111 MG/DL (ref 70–105)
GLUCOSE UR STRIP-MCNC: NEGATIVE MG/DL
HCT VFR BLD CALC: 36 % (ref 35–52)
HGB BLD-MCNC: 11.8 G/DL (ref 11.5–16)
KETONES UR QL STRIP: NEGATIVE
LEUKOCYTE ESTERASE UR QL STRIP: NEGATIVE
LYMPHOCYTES # BLD AUTO: 2.4 10^3/UL (ref 1–4)
LYMPHOCYTES NFR BLD AUTO: 33 % (ref 12–44)
MANUAL DIFFERENTIAL PERFORMED BLD QL: NO
MCH RBC QN AUTO: 29 PG (ref 25–34)
MCHC RBC AUTO-ENTMCNC: 33 G/DL (ref 32–36)
MCV RBC AUTO: 89 FL (ref 80–99)
MONOCYTES # BLD AUTO: 0.4 10^3/UL (ref 0–1)
MONOCYTES NFR BLD AUTO: 5 % (ref 0–12)
NEUTROPHILS # BLD AUTO: 4.4 10^3/UL (ref 1.8–7.8)
NEUTROPHILS NFR BLD AUTO: 60 % (ref 42–75)
NITRITE UR QL STRIP: NEGATIVE
PH UR STRIP: 6 [PH] (ref 5–9)
PLATELET # BLD: 297 10^3/UL (ref 130–400)
PMV BLD AUTO: 10.5 FL (ref 9–12.2)
POTASSIUM SERPL-SCNC: 3.3 MMOL/L (ref 3.6–5)
PROT UR QL STRIP: NEGATIVE
RBC #/AREA URNS HPF: (no result) /HPF
SODIUM SERPL-SCNC: 139 MMOL/L (ref 135–145)
SP GR UR STRIP: 1.01 (ref 1.02–1.02)
SQUAMOUS #/AREA URNS HPF: (no result) /HPF
WBC # BLD AUTO: 7.2 10^3/UL (ref 4.3–11)
WBC #/AREA URNS HPF: (no result) /HPF

## 2023-09-12 PROCEDURE — 85025 COMPLETE CBC W/AUTO DIFF WBC: CPT

## 2023-09-12 PROCEDURE — 36415 COLL VENOUS BLD VENIPUNCTURE: CPT

## 2023-09-12 PROCEDURE — 74018 RADEX ABDOMEN 1 VIEW: CPT

## 2023-09-12 PROCEDURE — 87088 URINE BACTERIA CULTURE: CPT

## 2023-09-12 PROCEDURE — 74176 CT ABD & PELVIS W/O CONTRAST: CPT

## 2023-09-12 PROCEDURE — 81000 URINALYSIS NONAUTO W/SCOPE: CPT

## 2023-09-12 PROCEDURE — 84703 CHORIONIC GONADOTROPIN ASSAY: CPT

## 2023-09-12 PROCEDURE — 80048 BASIC METABOLIC PNL TOTAL CA: CPT

## 2023-09-12 NOTE — ED ABDOMINAL PAIN
General


Chief Complaint:  Abdominal/GI Problems


Stated Complaint:  SHARP PAIN IN RT SIDE


Nursing Triage Note:  


patient states rt flank pain x4 days. states worse today, "had to call off work"




patient states she has had pain like this before. states hx of kidney stone four




years ago.


Source of Information:  Patient


Exam Limitations:  No Limitations


 (WOJCIECH HOLBROOK)





History of Present Illness


Date Seen by Provider:  Sep 12, 2023


Time Seen by Provider:  21:00


Initial Comments


41yo F with h/o nephrolithiasis requiring lithotripsy and stenting and tubal 

ligation presents to the ED with c/o new onset RLQ pain and R flank pain that 

started 4 days and has been worsening. Pt notes that pain had no inciting event,

she was sitting down after work when she suddenly experienced sharp, RLQ pain 

and cramping in her R flank. Pt states that pain is worse when laying or 

standing still/in the same position for too long and is improved when laying do

wn and changing positions. Pt states that RLQ is a 7/10 sharp, 

stabbing/throbbing, pain that makes it hard to take deep breaths. Pt has been 

taking tylenol and ibuprofen for pain with no relief. Last dose of medication 

was [id number] tabs of tylenol ~3hours ago. Pt states that pain is similar to when 

she had a kidney stone ~ 4 years ago. Pt notes that she has been experiencing a 

decreased appetite secondary to pain and new onset nonproductive cough that 

started 2 days ago. Pt denies dysuria or hematuria but does note that she feels 

like she has not been completely emptying bladder when voiding. LMP was 2 weeks 

ago and LBM was this morning and was regular. Denies fever, nausea, vomiting, 

diarrhea, abnormal vaginal discharge, HA, chills, CP, and h/o colonoscopy.


Timing/Duration:  3-4 Days


Severity/Quality:  Moderate


Location:  RLQ, Flank (right)


Radiation:  No Radiation


Activities at Onset:  None


Modifying Factors:  Improves With Lying down (initially better and then worse if

in same position for too long), Improves With Movement (makes better)


Associated Symptoms:  Back Pain (R flank), Shortness of Air (secondary to pain) 

(WOJCIECH HOLBROOK)





Allergies and Home Medications


Allergies


Coded Allergies:  


     No Known Drug Allergies (Unverified , 17)





Patient Home Medication List


Home Medication List Reviewed:  Yes


 (WOJCIECH HOLBROOK)


Home Medication List Reviewed:  Yes


 (HEDY STALLINGS MD)


Cyclobenzaprine HCl (Cyclobenzaprine HCl) 10 Mg Tablet, 10 MG PO Q8H


   Prescribed by: BECKY KESSLER on 17 0148


Hydrocodone/Acetaminophen (Hydrocodone/Acetaminophen 5 MG/325 MG TAB) 1 Each 

Tablet, 1 EACH PO Q4H PRN for PAIN-SEVERE


   Prescribed by: DUSTY SALGADO on 17 1156


Hydrocodone/Acetaminophen (Hydrocodone/Acetaminophen 5 MG/325 MG TAB) 1 Each 

Tablet, 1 EACH PO Q6H PRN for PAIN-SEVERE TO BREAKTHROUGH


   Prescribed by: DUSTY SALGADO on 17 1443


Hydrocodone/Acetaminophen (Hydrocodone-Acetamin 5-325 mg) 5 Mg-325 Mg Tablet, 1 

TAB PO Q6H PRN for PAIN-MODERATE (5-7)


   Prescribed by: NOELLE GALVEZ on 22 1843


Mupirocin Calcium (Mupirocin) 2 % Cream..g., 1 APPFUL TP BID


   Prescribed by: NOELLE GALVEZ on 22 184


Naproxen (Naproxen) 500 Mg Tablet, 500 MG PO BID


   Prescribed by: BECKY KESSLER on 17 0148


Sulfamethoxazole/Trimethoprim (Bactrim Ds Tablet) 1 Each Tablet, 1 EACH PO BID


   Prescribed by: NOELLE GALVEZ on 22 1828





Review of Systems


Review of Systems


Constitutional:  no symptoms reported; No chills, No fever


EENTM:  No Symptoms Reported


Respiratory:  Cough (nonproductive), Shortness of Air (secondary to pain)


Cardiovascular:  See HPI; Denies Chest Pain


Gastrointestinal:  Abdominal Pain (RLQ); Denies Constipated, Denies Diarrhea, 

Denies Nausea; Poor Appetite (decreased ); Denies Vomiting


Genitourinary:  Denies Burning, Denies Discharge, Denies Frequency; Flank Pain 

(R flank); Denies Hematuria, Denies Pain, Denies Urgency


Musculoskeletal:  no symptoms reported


Skin:  no symptoms reported


Psychiatric/Neurological:  No Symptoms Reported


Endocrine:  No Symptoms Reported


Hematologic/Lymphatic:  No Symptoms Reported (WOJCIECH HOLBROOK)





All Other Systems Reviewed


Negative Unless Noted:  Yes


 (WOJCIECH HOLBROOK)





Past Medical-Social-Family Hx


Patient Social History


Tobacco Use?:  Yes


Tobacco type used:  Cigarettes


Smoking Status:  Current Everyday Smoker (.5ppd)


Substance use?:  No


Alcohol Use?:  Yes


Alcohol Frequency:  Rarely


 (WOJCIECH HOLBROOK)





Immunizations Up To Date


Tetanus Booster (TDap):  Less than 5yrs


 (WOJCIECH HOLBROOK)





Past Medical History


Surgery/Hospitalization HX:  


Gallbladder, D&C, Tubal, Kidney Stone removal and ureteral stent


Surgeries:  Yes (KIDNEY STONE/URETERAL STENT PLACEMENT, D&C)


Gallbladder, Renal, Tubal Ligation


Respiratory:  No


Cardiac:  No


Neurological:  No


Reproductive Disorders:  No


GYN History:  Tubal Ligation


Genitourinary:  Yes


Kidney Stones


Gastrointestinal:  Yes


Gastroesophageal Reflux, Gall Bladder Disease


Musculoskeletal:  Yes


Chronic Back Pain


Endocrine:  No


HEENT:  No


Cancer:  No


Psychosocial:  Yes


Anxiety


Integumentary:  No


Blood Disorders:  No


 (WOJCIECH HOLBROOK)





Family Medical History


Cancer (Grandmother-colon (age 76) and breast )


 (WOJCIECH HOLBROOK)





Physical Exam


Vital Signs





Vital Signs - First Documented








 23





 20:43


 


Temp 36.9


 


Pulse 89


 


Resp 20


 


B/P (MAP) 145/83 (103)


 


Pulse Ox 99


 


O2 Delivery Room Air





 (HEDY STALLINGS MD)


Vital Signs


Capillary Refill : Less Than 3 Seconds 


 (WOJCIECH HOLBROOK)


Height/Weight/BMI


Height: 5'4.00"


Weight: 200lbs. oz. 90.567838mc; 38.00 BMI


Method:Estimated


General Appearance:  WD/WN, no apparent distress, obese


HEENT:  PERRL/EOMI


Respiratory:  lungs clear, normal breath sounds, no respiratory distress, no 

accessory muscle use


Cardiovascular:  regular rate, rhythm, no murmur


Gastrointestinal:  normal bowel sounds, soft; No guarding, No rebound; 

tenderness (RLQ)


Back:  no vertebral tenderness, CVA tenderness (R)


Neurologic/Psychiatric:  alert, normal mood/affect, oriented x 3


Skin:  normal color, warm/dry


Lymphatic:  no adenopathy (WOJCIECH HOLBROOK)





Progress/Results/Core Measures


Results/Orders


Lab Results





Laboratory Tests








Test


 23


20:42 23


20:45 Range/Units


 


 


Urine Color YELLOW    


 


Urine Clarity CLEAR    


 


Urine pH 6.0   5-9  


 


Urine Specific Gravity 1.010 L  1.016-1.022  


 


Urine Protein NEGATIVE   NEGATIVE  


 


Urine Glucose (UA) NEGATIVE   NEGATIVE  


 


Urine Ketones NEGATIVE   NEGATIVE  


 


Urine Nitrite NEGATIVE   NEGATIVE  


 


Urine Bilirubin NEGATIVE   NEGATIVE  


 


Urine Urobilinogen 0.2   < = 1.0  MG/DL


 


Urine Leukocyte Esterase NEGATIVE   NEGATIVE  


 


Urine RBC (Auto) TRACE H  NEGATIVE  


 


Urine RBC RARE    /HPF


 


Urine WBC 0-2    /HPF


 


Urine Squamous Epithelial


Cells 5-10 


 


  /HPF





 


Urine Crystals NONE    /LPF


 


Urine Bacteria FEW H   /HPF


 


Urine Casts NONE    /LPF


 


Urine Mucus NEGATIVE    /LPF


 


Urine Culture Indicated YES    


 


Urine Pregnancy Test NEGATIVE   NEGATIVE  


 


White Blood Count


 


 7.2 


 4.3-11.0


10^3/uL


 


Red Blood Count


 


 4.06 


 3.80-5.11


10^6/uL


 


Hemoglobin  11.8  11.5-16.0  g/dL


 


Hematocrit  36  35-52  %


 


Mean Corpuscular Volume  89  80-99  fL


 


Mean Corpuscular Hemoglobin  29  25-34  pg


 


Mean Corpuscular Hemoglobin


Concent 


 33 


 32-36  g/dL





 


Red Cell Distribution Width  15.5 H 10.0-14.5  %


 


Platelet Count


 


 297 


 130-400


10^3/uL


 


Mean Platelet Volume  10.5  9.0-12.2  fL


 


Immature Granulocyte % (Auto)  0   %


 


Neutrophils (%) (Auto)  60  42-75  %


 


Lymphocytes (%) (Auto)  33  12-44  %


 


Monocytes (%) (Auto)  5  0-12  %


 


Eosinophils (%) (Auto)  1  0-10  %


 


Basophils (%) (Auto)  0  0-10  %


 


Neutrophils # (Auto)


 


 4.4 


 1.8-7.8


10^3/uL


 


Lymphocytes # (Auto)


 


 2.4 


 1.0-4.0


10^3/uL


 


Monocytes # (Auto)


 


 0.4 


 0.0-1.0


10^3/uL


 


Eosinophils # (Auto)


 


 0.1 


 0.0-0.3


10^3/uL


 


Basophils # (Auto)


 


 0.0 


 0.0-0.1


10^3/uL


 


Immature Granulocyte # (Auto)


 


 0.0 


 0.0-0.1


10^3/uL


 


Sodium Level  139  135-145  MMOL/L


 


Potassium Level  3.3 L 3.6-5.0  MMOL/L


 


Chloride Level  108 H   MMOL/L


 


Carbon Dioxide Level  19 L 21-32  MMOL/L


 


Anion Gap  12  5-14  MMOL/L


 


Blood Urea Nitrogen  8  7-18  MG/DL


 


Creatinine


 


 0.74 


 0.60-1.30


MG/DL


 


Estimat Glomerular Filtration


Rate 


 104 


  





 


BUN/Creatinine Ratio  11   


 


Glucose Level  111 H   MG/DL


 


Calcium Level  9.3  8.5-10.1  MG/DL





 (HEDY STALLINGS MD)


My Orders





Orders - HEDY STALLINGS MD


Ed Iv/Invasive Line Start (23 21:00)


Cbc With Automated Diff (23 21:00)


Basic Metabolic Panel (23 21:00)


Ua Culture If Indicated (23 21:00)


Ns Iv 1000 Ml (Ns Iv 1000 Ml) (23 21:00)


Ketorolac Injection (Ketorolac Injection (23 21:00)


Ondansetron Injection (Ondansetron  Inj (23 21:00)


Hcg,Qualitative Urine (23 21:07)


Urine Culture (23 20:42)


Ct Abd/Pelvis Wo(Kidney Stone) (23 21:22)


Abdomen/Kub 1view (23 21:22)


 (HEDY STALLINGS MD)


Medications Given in ED





Current Medications








 Medications  Dose


 Ordered  Sig/Jam


 Route  Start Time


 Stop Time Status Last Admin


Dose Admin


 


 Ketorolac


 Tromethamine  15 mg  ONCE  ONCE


 IVP  23 21:00


 23 21:02 DC 23 21:11


15 MG


 


 Ondansetron HCl  4 mg  ONCE  ONCE


 IVP  23 21:00


 23 21:02 DC 23 21:10


4 MG





 (HEDY STALLINGS MD)


Vital Signs/I&O











 23





 20:43


 


Temp 36.9


 


Pulse 89


 


Resp 20


 


B/P (MAP) 145/83 (103)


 


Pulse Ox 99


 


O2 Delivery Room Air





 (HEDY STALLINGS MD)








Blood Pressure Mean:                    103











Progress


Progress Note :  


   Time:  23:02


Progress Note


Patient seen and evaluated by me. I have reviewed the medical student's 

documentation and agree. My eval today includes a physical exam, CBC, BMP, UA 

and KUB with CT noncontrast abdomen and pelvis.  Pertinent physical exam 

findings - WDWN obese female in NAD. SHe has tenderness to palpation in the 

right flank and RLQ. No rebound/rovsing's or involuntary guarding. Heart is reg,

lungs are clear.  VSS - patient is afebrile.





Ddx based on H&P - kidney stone, pyelonephritis, diverticulitis, musculoskeletal

pain





Labs, KUB independently reviewed and interpreted by me. Her CBC is normal Her 

BMP is pertinent for a slightly low potassium at 3.3 with a lowe CO2 at 19.  UA 

shows trace RBC, 1+ bacteria.  Her prgnancy test is negative. Her KUB is 

unremarkable.  CT read by radiology  - no visualised kidney stones. Appy 

identified and appears normal. Patient is treated with Toradol 15mg IV. She has 

improvement in her pain from a "7" down t a "3". I advised NSAIDs at home and 

monitoring for worsening or changing symptoms. I do not suspect acute surgical 

process. No diverticulitis identified on CT either.  Patient is given return 

precautions and verbalizes understanding. All questions are sought and answered.


 (HEDY STALLINGS MD)





Diagnostic Imaging





   Diagonstic Imaging:  CT


Comments


                 [facility] [place], [place]





NAME:   WILLAM ELKINS


MED REC#:   [id number]


ACCOUNT#:   [id number]


PT STATUS:   REG ER


:   1980


PHYSICIAN:   HEDY STALLINGS MD


ADMIT DATE:   23/ER


                                  ***Signed***


Date of Exam:23





CT ABD/PELVIS WO(KIDNEY STONE)








PROCEDURE: CT urinary tract, rule out kidney stone.





TECHNIQUE: Multiple contiguous axial images were obtained through


the abdomen and pelvis without the use of intravenous contrast.


Auto Exposure Controls were utilized during the CT exam to meet


ALARA standards for radiation dose reduction. 





INDICATION:  Right-sided flank pain. Evaluate for kidney stones.





COMPARISON: 2017





FINDINGS:


The lung bases demonstrate no pneumonia or edema. There is no


pleural or pericardial fluid.





Liver demonstrates no noncontrast evidence of a focal


intrahepatic abnormality. The gallbladder surgically absent.


There is no abnormal biliary dilatation. Spleen is normal. The


pancreas is unremarkable. There is a stable left adrenal nodule.


This has low density and is compatible with an adenoma. The right


adrenal gland is normal.





The kidneys demonstrate no hydronephrosis or perinephric fat


stranding. There is a low-density focus in the left kidney


compatible with presumed cyst. There is no evidence of


urolithiasis. There is no stone within the ureters or within the


urinary bladder.





There is no evidence of bowel obstruction. There is no abnormal


bowel thickening. There is moderate stool within the colon. The


appendix is normal.





The urinary bladder, uterus and adnexa are unremarkable.





There is no free air, free fluid or abscess. There is no


adenopathy. Aorta is normal in caliber. There is no acute osseous


abnormality.





IMPRESSION:


1. No CT evidence of an acute inflammatory or obstructive process


within the abdomen or pelvis.


2. No findings of urolithiasis or hydronephrosis. There is no


perinephric fat stranding.


3. Status post previous cholecystectomy without biliary


dilatation.


4. No bowel obstruction or appendicitis.


5. No free fluid.














Dictated by: 





  Dictated on workstation # [id number]








Dict:   23


Trans:   23


 [id number]





Interpreted by:     LEE WAYNE MD


Electronically signed by: LEE WAYNE MD 23








   Diagonstic Imaging:  Xray


Comments


KUB independently reviewed and interpreted by me - no acute process


 (HEDY STALLINGS MD)





Departure


Impression





   Primary Impression:  


   Abdominal pain


   Qualified Codes:  R10.9 - Unspecified abdominal pain


Disposition:  01 HOME, SELF-CARE


Condition:  Improved





Departure-Patient Inst.


Decision time for Depature:  23:00


 (HEDY STALLINGS MD)


Referrals:  


[facility] (PCP/Family)


Primary Care Physician


Patient Instructions:  Abdominal Pain, Adult ED





Add. Discharge Instructions:  


Follow a clear liquid diet for the next 12 hours and then slowly advance as 

tolerated.





Use over the counter ibuprofen 3 pills (600mg) every 6 hours as needed for pain.

Always take ibuprofen with food.





If you develop fever, worsening pain, vomiting or any other emergent, concerning

symptoms - please return to the Emergency Department for re-evaluation.





PLease make a follow up appointment with your doctor.


Verification and Attestation of Medical Student E/M Service





A medical student performed and documented this service in my presence. I 

reviewed and verified all information documented by the medical student and made

modifications to such information, when appropriate. I personally performed the 

physical exam and medical decision making. 





 Hedy Stallings, Sep 12, 2023,23:02


  


 (HEDY STALLINGS MD)





Copy


Copies To 1:   ARLENE ROBLES TAYLOR               Sep 12, 2023 21:17


HEDY STALLINGS MD         Sep 12, 2023 22:22

## 2023-09-12 NOTE — DIAGNOSTIC IMAGING REPORT
PROCEDURE: CT urinary tract, rule out kidney stone.



TECHNIQUE: Multiple contiguous axial images were obtained through

the abdomen and pelvis without the use of intravenous contrast.

Auto Exposure Controls were utilized during the CT exam to meet

ALARA standards for radiation dose reduction. 



INDICATION:  Right-sided flank pain. Evaluate for kidney stones.



COMPARISON: 09/01/2017



FINDINGS:

The lung bases demonstrate no pneumonia or edema. There is no

pleural or pericardial fluid.



Liver demonstrates no noncontrast evidence of a focal

intrahepatic abnormality. The gallbladder surgically absent.

There is no abnormal biliary dilatation. Spleen is normal. The

pancreas is unremarkable. There is a stable left adrenal nodule.

This has low density and is compatible with an adenoma. The right

adrenal gland is normal.



The kidneys demonstrate no hydronephrosis or perinephric fat

stranding. There is a low-density focus in the left kidney

compatible with presumed cyst. There is no evidence of

urolithiasis. There is no stone within the ureters or within the

urinary bladder.



There is no evidence of bowel obstruction. There is no abnormal

bowel thickening. There is moderate stool within the colon. The

appendix is normal.



The urinary bladder, uterus and adnexa are unremarkable.



There is no free air, free fluid or abscess. There is no

adenopathy. Aorta is normal in caliber. There is no acute osseous

abnormality.



IMPRESSION:

1. No CT evidence of an acute inflammatory or obstructive process

within the abdomen or pelvis.

2. No findings of urolithiasis or hydronephrosis. There is no

perinephric fat stranding.

3. Status post previous cholecystectomy without biliary

dilatation.

4. No bowel obstruction or appendicitis.

5. No free fluid.









Dictated by: 



  Dictated on workstation # [id number]

## 2023-09-13 NOTE — DIAGNOSTIC IMAGING REPORT
INDICATION: right flank pain.



TECHNIQUE: 2 supine radiograph of the abdomen 10:01 PM



CORRELATION STUDY: 09/01/2017



FINDINGS:  

Imaging of the abdomen demonstrates the bowel gas pattern to be

unremarkable and without evidence for obstruction. No significant

differential air-fluid levels. No evidence for free air. No

pathologic intraabdominal calcifications. Costectomy clips right

upper quadrant. Question hepatic enlargement.



IMPRESSION:

1. Non-obstructed appearing bowel gas pattern.



Dictated by: 



  Dictated on workstation # [id number] no